# Patient Record
Sex: FEMALE | Race: WHITE | URBAN - METROPOLITAN AREA
[De-identification: names, ages, dates, MRNs, and addresses within clinical notes are randomized per-mention and may not be internally consistent; named-entity substitution may affect disease eponyms.]

---

## 2018-05-24 ENCOUNTER — TRANSFERRED RECORDS (OUTPATIENT)
Dept: HEALTH INFORMATION MANAGEMENT | Facility: CLINIC | Age: 28
End: 2018-05-24

## 2018-05-30 ENCOUNTER — OFFICE VISIT (OUTPATIENT)
Dept: FAMILY MEDICINE | Facility: CLINIC | Age: 28
End: 2018-05-30

## 2018-05-30 VITALS
RESPIRATION RATE: 18 BRPM | OXYGEN SATURATION: 94 % | TEMPERATURE: 97.8 F | SYSTOLIC BLOOD PRESSURE: 96 MMHG | WEIGHT: 126.5 LBS | HEART RATE: 71 BPM | HEIGHT: 63 IN | BODY MASS INDEX: 22.41 KG/M2 | DIASTOLIC BLOOD PRESSURE: 62 MMHG

## 2018-05-30 DIAGNOSIS — Z76.89 ENCOUNTER TO ESTABLISH CARE: Primary | ICD-10-CM

## 2018-05-30 DIAGNOSIS — G89.29 CHRONIC BILATERAL LOW BACK PAIN WITHOUT SCIATICA: ICD-10-CM

## 2018-05-30 DIAGNOSIS — M54.50 CHRONIC BILATERAL LOW BACK PAIN WITHOUT SCIATICA: ICD-10-CM

## 2018-05-30 DIAGNOSIS — G89.29 OTHER CHRONIC PAIN: ICD-10-CM

## 2018-05-30 PROCEDURE — 99203 OFFICE O/P NEW LOW 30 MIN: CPT | Performed by: NURSE PRACTITIONER

## 2018-05-30 ASSESSMENT — PAIN SCALES - GENERAL: PAINLEVEL: MILD PAIN (2)

## 2018-05-30 NOTE — MR AVS SNAPSHOT
"              After Visit Summary   5/30/2018    Melinda Zaldivar    MRN: [id number]           Patient Information     Date Of Birth          1990        Visit Information        Provider Department      5/30/2018 10:20 AM Nicolette Dunbar NP [facility]        Today's Diagnoses     Chronic bilateral low back pain without sciatica    -  1      Care Instructions    HPV co-testing: if yes, then due for pap in 2 more years, if no due for pap this year          Follow-ups after your visit        Additional Services     PHYSICAL THERAPY REFERRAL       *This therapy referral will be filtered to a centralized scheduling office at [facility] and the patient will receive a call to schedule an appointment at a [place] location most convenient for them. *     [facility] provides Physical Therapy evaluation and treatment and many specialty services across the [place] system.  If requesting a specialty program, please choose from the list below.    If you have not heard from the scheduling office within 2 business days, please call [phone] for all locations, with the exception of [place], please call [phone] and [facility], please call [phone]  Treatment: Evaluation & Treatment  Special Instructions/Modalities: has mid-back pain  Special Programs: None    Please be aware that coverage of these services is subject to the terms and limitations of your health insurance plan.  Call member services at your health plan with any benefit or coverage questions.      **Note to Provider:  If you are referring outside of [place] for the therapy appointment, please list the name of the location in the \"special instructions\" above, print the referral and give to the patient to schedule the appointment.                  Who to contact     If you have questions or need follow up information about today's clinic visit or your schedule please contact [facility]" "[facility] directly at [phone].  Normal or non-critical lab and imaging results will be communicated to you by [employer], letter or phone within 4 business days after the clinic has received the results. If you do not hear from us within 7 days, please contact the clinic through [employer] or phone. If you have a critical or abnormal lab result, we will notify you by phone as soon as possible.  Submit refill requests through [employer] or call your pharmacy and they will forward the refill request to us. Please allow 3 business days for your refill to be completed.          Additional Information About Your Visit        [facility] [employer] Information     [employer] lets you send messages to your doctor, view your test results, renew your prescriptions, schedule appointments and more. To sign up, go to [email or web address] . Click on \"Log in\" on the left side of the screen, which will take you to the Welcome page. Then click on \"Sign up Now\" on the right side of the page.     You will be asked to enter the access code listed below, as well as some personal information. Please follow the directions to create your username and password.     Your access code is: [email or web address]  Expires: 2018 10:22 AM     Your access code will  in 90 days. If you need help or a new code, please call your [place] clinic or [phone].        Care EveryWhere ID     This is your Care EveryWhere ID. This could be used by other organizations to access your [place] medical records  [id number]        Your Vitals Were     Pulse Temperature Respirations Height Last Period Pulse Oximetry    71 97.8  F (36.6  C) (Oral) 18 1.588 m (5' 2.5\") 2018 94%    BMI (Body Mass Index)                   22.77 kg/m2            Blood Pressure from Last 3 Encounters:   18 96/62    Weight from Last 3 Encounters:   18 57.4 kg (126 lb 8 oz)              We Performed the Following     PHYSICAL THERAPY REFERRAL        Primary Care Provider Office Phone # " Fax #    [facility] [phone] [address] [facility] [address]        Equal Access to Services     OTILIO MCKINNEY : Hadii aad ku hadkristinekiersten Moriahselene, waconnieda michaeltan, qakeithta kaalmada memo, louis choi leonardojaved valencia doreen saldivar. So [facility] [phone].    ATENCIÓN: Si habla español, tiene a sam disposición servicios gratuitos de asistencia lingüística. Llame al [phone].    We comply with applicable federal civil rights laws and [place] laws. We do not discriminate on the basis of race, color, national origin, age, disability, sex, sexual orientation, or gender identity.            Thank you!     Thank you for choosing [facility]  for your care. Our goal is always to provide you with excellent care. Hearing back from our patients is one way we can continue to improve our services. Please take a few minutes to complete the written survey that you may receive in the mail after your visit with us. Thank you!             Your Updated Medication List - Protect others around you: Learn how to safely use, store and throw away your medicines at www.disposemymeds.org.      Notice  As of 5/30/2018 10:52 AM    You have not been prescribed any medications.

## 2018-05-30 NOTE — PROGRESS NOTES
SUBJECTIVE:   Melinda Zaldivar is a 27 year old female who presents to clinic today for the following health issues:      ED/UC Followup:    Facility:  [facility]  Date of visit: 5-24-18  Reason for visit: Back pain  Current Status: Back pain has improved.       Always has a sore back. Is a , is unsure if has bad bed. Exercise, yoga helps. Last Wednesday was sharp middling of her back, standing helped, sitting was difficult, laying on back was impossible, on her side it was hard to breathe. Tried to sleep that night, tried sleeping on daughters bed. The ER described it as muscle spasms. Was given a steroid and muscle relaxer.  She is unsure of the names, she did not bring her d/c paperwork.--ER paperwork faxed over. She was given medrol dose pack and tramadol for pain.    Today she is 2/10 for pain. Much improved-Pain does not radiate does her legs but sometimes shoots up her back. Denies neuropathy. Her back pain ranges from low to upper back. Doing exercises about 3-4x/week at home. Working 5-6 days a week, about 30-32 hr/week.     Pap smear was 3 years ago. Has 2 children, a 6 yr and 3 yr old.     Problem list and histories reviewed & adjusted, as indicated.  Additional history: as documented    Patient Active Problem List   Diagnosis     Chronic bilateral low back pain without sciatica     Other chronic pain     History reviewed. No pertinent surgical history.    Social History   Substance Use Topics     Smoking status: Not on file     Smokeless tobacco: Not on file     Alcohol use Not on file     Family History   Problem Relation Age of Onset     Seizure Disorder Mother      Asthma Father      Anxiety Disorder Father      Depression Paternal Grandmother      manic     Skin Cancer Maternal Aunt          No current outpatient prescriptions on file.     No Known Allergies    Reviewed and updated as needed this visit by clinical staff       Reviewed and updated as needed this visit by Provider      "    ROS:  Constitutional, cardiovascular, pulmonary, gi and gu systems are negative, except as otherwise noted.    OBJECTIVE:     BP 96/62 (BP Location: Right arm, Patient Position: Sitting, Cuff Size: Adult Regular)  Pulse 71  Temp 97.8  F (36.6  C) (Oral)  Resp 18  Ht 1.588 m (5' 2.5\")  Wt 57.4 kg (126 lb 8 oz)  LMP 05/21/2018  SpO2 94%  BMI 22.77 kg/m2  Body mass index is 22.77 kg/(m^2).  GENERAL: healthy, alert and no distress  RESP: lungs clear to auscultation - no rales, rhonchi or wheezes  CV: regular rate and rhythm, normal S1 S2, no S3 or S4, no murmur, click or rub  ABDOMEN: soft, nontender, no hepatosplenomegaly, no masses and bowel sounds normal  MS: no gross musculoskeletal defects noted, no edema  SKIN: no suspicious lesions or rashes  BACK: no CVA tenderness, slight paralumbar tenderness. Positive straight leg test bilaterally with pain/tightness noted mid-back.     Diagnostic Test Results:  none     ASSESSMENT/PLAN:         1. Encounter to establish care  Stable. Switching care.     2. Chronic bilateral low back pain without sciatica  Trial PT. Has had back pain for long time. 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 No plan to get out. Encouraged other exercises. Monitor for now  - PHYSICAL THERAPY REFERRAL    3. Other chronic pain  As above      FUTURE APPOINTMENTS:       - Follow-up visit prn. Check on when pap is due    MARY Riggs, NP-C  [facility]    "

## 2018-06-02 ENCOUNTER — HEALTH MAINTENANCE LETTER (OUTPATIENT)
Age: 28
End: 2018-06-02

## 2019-07-03 ENCOUNTER — TELEPHONE (OUTPATIENT)
Dept: FAMILY MEDICINE | Facility: CLINIC | Age: 29
End: 2019-07-03

## 2019-07-03 NOTE — TELEPHONE ENCOUNTER
Panel Management Review   One phone call and send letter if unable to reach them or MyChart message and send letter if not read after 2 weeks (You will get a message to your inbasket)      BP Readings from Last 1 Encounters:   05/30/18 96/62        Health Maintenance Due   Topic Date Due     PREVENTIVE CARE VISIT  1990     TIMBO ASSESSMENT  1990     PAP  1990     PHQ-9  1990     HIV SCREENING  09/08/2004     DTAP/TDAP/TD IMMUNIZATION (1 - Tdap) 09/08/2014        Fail List measure: BMI        Patient is due/failing the following:   BMI    Action needed:   Patient needs office visit for Preventative Care Visit.    Type of outreach:    Phone, left message for patient to call back.     Questions for provider review:    None                                                                                                                   Mouna Gaston